# Patient Record
Sex: MALE | Race: WHITE | HISPANIC OR LATINO
[De-identification: names, ages, dates, MRNs, and addresses within clinical notes are randomized per-mention and may not be internally consistent; named-entity substitution may affect disease eponyms.]

---

## 2017-04-06 ENCOUNTER — APPOINTMENT (OUTPATIENT)
Dept: FAMILY MEDICINE | Facility: CLINIC | Age: 52
End: 2017-04-06

## 2017-04-06 ENCOUNTER — NON-APPOINTMENT (OUTPATIENT)
Age: 52
End: 2017-04-06

## 2017-04-06 ENCOUNTER — MED ADMIN CHARGE (OUTPATIENT)
Age: 52
End: 2017-04-06

## 2017-04-06 VITALS
HEART RATE: 51 BPM | TEMPERATURE: 97.6 F | RESPIRATION RATE: 14 BRPM | BODY MASS INDEX: 26.84 KG/M2 | SYSTOLIC BLOOD PRESSURE: 122 MMHG | OXYGEN SATURATION: 98 % | HEIGHT: 66 IN | DIASTOLIC BLOOD PRESSURE: 78 MMHG | WEIGHT: 167 LBS

## 2017-04-06 DIAGNOSIS — Z78.9 OTHER SPECIFIED HEALTH STATUS: ICD-10-CM

## 2017-04-06 DIAGNOSIS — Z00.00 ENCOUNTER FOR GENERAL ADULT MEDICAL EXAMINATION W/OUT ABNORMAL FINDINGS: ICD-10-CM

## 2017-04-06 DIAGNOSIS — H91.90 UNSPECIFIED HEARING LOSS, UNSPECIFIED EAR: ICD-10-CM

## 2019-03-03 ENCOUNTER — EMERGENCY (EMERGENCY)
Facility: HOSPITAL | Age: 54
LOS: 1 days | Discharge: HOME | End: 2019-03-03
Attending: EMERGENCY MEDICINE
Payer: COMMERCIAL

## 2019-03-03 VITALS
OXYGEN SATURATION: 97 % | SYSTOLIC BLOOD PRESSURE: 129 MMHG | TEMPERATURE: 99 F | HEART RATE: 57 BPM | RESPIRATION RATE: 18 BRPM | DIASTOLIC BLOOD PRESSURE: 76 MMHG

## 2019-03-03 VITALS
DIASTOLIC BLOOD PRESSURE: 81 MMHG | HEIGHT: 69 IN | OXYGEN SATURATION: 99 % | RESPIRATION RATE: 18 BRPM | HEART RATE: 72 BPM | SYSTOLIC BLOOD PRESSURE: 138 MMHG | WEIGHT: 169.98 LBS | TEMPERATURE: 99 F

## 2019-03-03 LAB
ANION GAP SERPL CALC-SCNC: 16 MMOL/L — SIGNIFICANT CHANGE UP (ref 5–17)
BASOPHILS # BLD AUTO: 0 K/UL — SIGNIFICANT CHANGE UP (ref 0–0.2)
BASOPHILS NFR BLD AUTO: 0.2 % — SIGNIFICANT CHANGE UP (ref 0–2)
BUN SERPL-MCNC: 10 MG/DL — SIGNIFICANT CHANGE UP (ref 7–23)
CALCIUM SERPL-MCNC: 9.1 MG/DL — SIGNIFICANT CHANGE UP (ref 8.4–10.5)
CHLORIDE SERPL-SCNC: 101 MMOL/L — SIGNIFICANT CHANGE UP (ref 96–108)
CO2 SERPL-SCNC: 24 MMOL/L — SIGNIFICANT CHANGE UP (ref 22–31)
CREAT SERPL-MCNC: 0.86 MG/DL — SIGNIFICANT CHANGE UP (ref 0.5–1.3)
EOSINOPHIL # BLD AUTO: 0.1 K/UL — SIGNIFICANT CHANGE UP (ref 0–0.5)
EOSINOPHIL NFR BLD AUTO: 1.1 % — SIGNIFICANT CHANGE UP (ref 0–6)
FLU A RESULT: SIGNIFICANT CHANGE UP
FLU A RESULT: SIGNIFICANT CHANGE UP
FLUAV AG NPH QL: SIGNIFICANT CHANGE UP
FLUBV AG NPH QL: DETECTED
GLUCOSE SERPL-MCNC: 94 MG/DL — SIGNIFICANT CHANGE UP (ref 70–99)
HCT VFR BLD CALC: 42.9 % — SIGNIFICANT CHANGE UP (ref 39–50)
HGB BLD-MCNC: 15.2 G/DL — SIGNIFICANT CHANGE UP (ref 13–17)
LYMPHOCYTES # BLD AUTO: 1.9 K/UL — SIGNIFICANT CHANGE UP (ref 1–3.3)
LYMPHOCYTES # BLD AUTO: 27.4 % — SIGNIFICANT CHANGE UP (ref 13–44)
MCHC RBC-ENTMCNC: 30.3 PG — SIGNIFICANT CHANGE UP (ref 27–34)
MCHC RBC-ENTMCNC: 35.4 GM/DL — SIGNIFICANT CHANGE UP (ref 32–36)
MCV RBC AUTO: 85.7 FL — SIGNIFICANT CHANGE UP (ref 80–100)
MONOCYTES # BLD AUTO: 0.7 K/UL — SIGNIFICANT CHANGE UP (ref 0–0.9)
MONOCYTES NFR BLD AUTO: 10.2 % — SIGNIFICANT CHANGE UP (ref 2–14)
NEUTROPHILS # BLD AUTO: 4.3 K/UL — SIGNIFICANT CHANGE UP (ref 1.8–7.4)
NEUTROPHILS NFR BLD AUTO: 61.2 % — SIGNIFICANT CHANGE UP (ref 43–77)
PLATELET # BLD AUTO: 245 K/UL — SIGNIFICANT CHANGE UP (ref 150–400)
POTASSIUM SERPL-MCNC: 3.7 MMOL/L — SIGNIFICANT CHANGE UP (ref 3.5–5.3)
POTASSIUM SERPL-SCNC: 3.7 MMOL/L — SIGNIFICANT CHANGE UP (ref 3.5–5.3)
RBC # BLD: 5.01 M/UL — SIGNIFICANT CHANGE UP (ref 4.2–5.8)
RBC # FLD: 11.6 % — SIGNIFICANT CHANGE UP (ref 10.3–14.5)
RSV RESULT: SIGNIFICANT CHANGE UP
RSV RNA RESP QL NAA+PROBE: SIGNIFICANT CHANGE UP
SODIUM SERPL-SCNC: 141 MMOL/L — SIGNIFICANT CHANGE UP (ref 135–145)
TROPONIN T, HIGH SENSITIVITY RESULT: <6 NG/L — SIGNIFICANT CHANGE UP (ref 0–51)
WBC # BLD: 7 K/UL — SIGNIFICANT CHANGE UP (ref 3.8–10.5)
WBC # FLD AUTO: 7 K/UL — SIGNIFICANT CHANGE UP (ref 3.8–10.5)

## 2019-03-03 PROCEDURE — 71046 X-RAY EXAM CHEST 2 VIEWS: CPT | Mod: 26

## 2019-03-03 PROCEDURE — 87631 RESP VIRUS 3-5 TARGETS: CPT

## 2019-03-03 PROCEDURE — 80048 BASIC METABOLIC PNL TOTAL CA: CPT

## 2019-03-03 PROCEDURE — 84484 ASSAY OF TROPONIN QUANT: CPT

## 2019-03-03 PROCEDURE — 99285 EMERGENCY DEPT VISIT HI MDM: CPT

## 2019-03-03 PROCEDURE — 99284 EMERGENCY DEPT VISIT MOD MDM: CPT | Mod: 25

## 2019-03-03 PROCEDURE — 85027 COMPLETE CBC AUTOMATED: CPT

## 2019-03-03 PROCEDURE — 71046 X-RAY EXAM CHEST 2 VIEWS: CPT

## 2019-03-03 RX ORDER — SODIUM CHLORIDE 9 MG/ML
2000 INJECTION INTRAMUSCULAR; INTRAVENOUS; SUBCUTANEOUS ONCE
Qty: 0 | Refills: 0 | Status: COMPLETED | OUTPATIENT
Start: 2019-03-03 | End: 2019-03-03

## 2019-03-03 RX ORDER — ACETAMINOPHEN 500 MG
975 TABLET ORAL ONCE
Qty: 0 | Refills: 0 | Status: COMPLETED | OUTPATIENT
Start: 2019-03-03 | End: 2019-03-03

## 2019-03-03 RX ADMIN — Medication 975 MILLIGRAM(S): at 19:01

## 2019-03-03 RX ADMIN — SODIUM CHLORIDE 2000 MILLILITER(S): 9 INJECTION INTRAMUSCULAR; INTRAVENOUS; SUBCUTANEOUS at 19:01

## 2019-03-03 NOTE — ED PROVIDER NOTE - ATTENDING CONTRIBUTION TO CARE
54 M no PMH p/w worsening cough, fever and diffuse chest tightness since Thursday with lungs cta, vss, cxr nl, pleuritic chest pain from cough, labs, rvp, ekg likely viral bronchitis.

## 2019-03-03 NOTE — ED PROVIDER NOTE - PHYSICAL EXAMINATION
Dayday PALACIO MD PGY1:   PHYSICAL EXAM:    GENERAL: NAD, well-developed  HEENT:  Atraumatic, Normocephalic  CHEST/LUNG: Chest rise equal bilaterally. Diffuse crackles bilaterally.   HEART: Regular rate and rhythm  ABDOMEN: Soft, Nontender, Nondistended  EXTREMITIES:  2+ Peripheral Pulses.  PSYCH: A&Ox3  SKIN: No obvious rashes or lesions

## 2019-03-03 NOTE — ED PROVIDER NOTE - NSFOLLOWUPINSTRUCTIONS_ED_ALL_ED_FT
Your diagnosis: chest tightness    Discharge instructions:    1. Please follow up with your Primary Care Doctor in 1-2 days.    2. Take any prescribed medications as instructed:    3. Others:    4. Be sure to return to the ED if you develop new or worsening symptoms. Specific signs and symptoms to be vigilant of: chest pain, pressure or tightness, difficulty breathing, sweating, nausea, vomiting, pain that travels to the arms, shoulders or jaws, palpitations, loss of consciousness, back pain, abdominal pain.

## 2019-03-03 NOTE — ED PROVIDER NOTE - CLINICAL SUMMARY MEDICAL DECISION MAKING FREE TEXT BOX
Dayday PALACIO MD PGY1: 54 M no PMH p/w worsening cough, fever and chest pain. WIll r/o pneumonia with CXR. Tylenol for fever. Trop for ACS r/o. Flu swab to eval.

## 2019-03-03 NOTE — ED ADULT TRIAGE NOTE - CCCP TRG CHIEF CMPLNT
chest pressure, fever, cough with yellow phlegm, "I have a cold" since Thursday, today pink tinged sputum

## 2019-03-03 NOTE — ED PROVIDER NOTE - OBJECTIVE STATEMENT
Dayday PALACIO MD PGY1: 54 M no PMH p/w worsening cough, fever and diffuse chest tightness since Thursday. Productive cough, initally white -> yellow-> blood tinged. No recent sick contacts.

## 2019-03-03 NOTE — ED ADULT NURSE NOTE - OBJECTIVE STATEMENT
53 y/o male presents to ed c/o chest pain that began Thursday, with yellow sputum  productive cough. States today the sputum is pink. Denies ha, n/v/d, abdominal pain, f/c, urinary symptoms, hematuria. A&Ox4, febrile, skin warm dry and intact, MAEx4, lungs CTA, abd soft nondistended. EKG shows NSR, placed on CM. Pt resting comfortably with fever, no complaints at this time. Patient's bed in the lowest position, explained plan of care to patient and family members. Will continue to reassess.

## 2019-03-03 NOTE — ED PROVIDER NOTE - PROGRESS NOTE DETAILS
[place]: patient received in sign-out. Negative cxr and trop. waiting Flu swab. However, will not treat via tamiflu even if positive, given age and lack of risk factors. Stable for discharge.

## 2019-06-10 NOTE — ED ADULT NURSE NOTE - CARDIO WDL
Normal rate, regular rhythm, normal S1, S2 heart sounds heard.
[FreeTextEntry1] : Rx for FBW\par recheck 2 weekas bp\par discussed weight loss

## 2021-03-20 ENCOUNTER — INPATIENT (INPATIENT)
Facility: HOSPITAL | Age: 56
LOS: 2 days | Discharge: HOME | DRG: 141 | End: 2021-03-23
Attending: SPECIALIST | Admitting: SPECIALIST
Payer: COMMERCIAL

## 2021-03-20 VITALS
DIASTOLIC BLOOD PRESSURE: 84 MMHG | HEIGHT: 69 IN | HEART RATE: 60 BPM | WEIGHT: 177.03 LBS | OXYGEN SATURATION: 100 % | TEMPERATURE: 98 F | SYSTOLIC BLOOD PRESSURE: 179 MMHG | RESPIRATION RATE: 16 BRPM

## 2021-03-20 DIAGNOSIS — S02.85XA FRACTURE OF ORBIT, UNSPECIFIED, INITIAL ENCOUNTER FOR CLOSED FRACTURE: ICD-10-CM

## 2021-03-20 LAB
ALBUMIN SERPL ELPH-MCNC: 4.2 G/DL — SIGNIFICANT CHANGE UP (ref 3.3–5)
ALP SERPL-CCNC: 86 U/L — SIGNIFICANT CHANGE UP (ref 40–120)
ALT FLD-CCNC: 34 U/L — SIGNIFICANT CHANGE UP (ref 10–45)
ANION GAP SERPL CALC-SCNC: 11 MMOL/L — SIGNIFICANT CHANGE UP (ref 5–17)
APTT BLD: 27.1 SEC — LOW (ref 27.5–35.5)
AST SERPL-CCNC: 26 U/L — SIGNIFICANT CHANGE UP (ref 10–40)
BASOPHILS # BLD AUTO: 0.04 K/UL — SIGNIFICANT CHANGE UP (ref 0–0.2)
BASOPHILS NFR BLD AUTO: 0.3 % — SIGNIFICANT CHANGE UP (ref 0–2)
BILIRUB SERPL-MCNC: 0.3 MG/DL — SIGNIFICANT CHANGE UP (ref 0.2–1.2)
BUN SERPL-MCNC: 13 MG/DL — SIGNIFICANT CHANGE UP (ref 7–23)
CALCIUM SERPL-MCNC: 8.8 MG/DL — SIGNIFICANT CHANGE UP (ref 8.4–10.5)
CHLORIDE SERPL-SCNC: 103 MMOL/L — SIGNIFICANT CHANGE UP (ref 96–108)
CO2 SERPL-SCNC: 24 MMOL/L — SIGNIFICANT CHANGE UP (ref 22–31)
CREAT SERPL-MCNC: 0.85 MG/DL — SIGNIFICANT CHANGE UP (ref 0.5–1.3)
EOSINOPHIL # BLD AUTO: 0.03 K/UL — SIGNIFICANT CHANGE UP (ref 0–0.5)
EOSINOPHIL NFR BLD AUTO: 0.2 % — SIGNIFICANT CHANGE UP (ref 0–6)
GLUCOSE SERPL-MCNC: 113 MG/DL — HIGH (ref 70–99)
HCT VFR BLD CALC: 41.7 % — SIGNIFICANT CHANGE UP (ref 39–50)
HGB BLD-MCNC: 14.3 G/DL — SIGNIFICANT CHANGE UP (ref 13–17)
IMM GRANULOCYTES NFR BLD AUTO: 0.3 % — SIGNIFICANT CHANGE UP (ref 0–1.5)
INR BLD: 0.98 RATIO — SIGNIFICANT CHANGE UP (ref 0.88–1.16)
LYMPHOCYTES # BLD AUTO: 1.22 K/UL — SIGNIFICANT CHANGE UP (ref 1–3.3)
LYMPHOCYTES # BLD AUTO: 9.2 % — LOW (ref 13–44)
MCHC RBC-ENTMCNC: 29.3 PG — SIGNIFICANT CHANGE UP (ref 27–34)
MCHC RBC-ENTMCNC: 34.3 GM/DL — SIGNIFICANT CHANGE UP (ref 32–36)
MCV RBC AUTO: 85.5 FL — SIGNIFICANT CHANGE UP (ref 80–100)
MONOCYTES # BLD AUTO: 0.64 K/UL — SIGNIFICANT CHANGE UP (ref 0–0.9)
MONOCYTES NFR BLD AUTO: 4.8 % — SIGNIFICANT CHANGE UP (ref 2–14)
NEUTROPHILS # BLD AUTO: 11.28 K/UL — HIGH (ref 1.8–7.4)
NEUTROPHILS NFR BLD AUTO: 85.2 % — HIGH (ref 43–77)
NRBC # BLD: 0 /100 WBCS — SIGNIFICANT CHANGE UP (ref 0–0)
PLATELET # BLD AUTO: 285 K/UL — SIGNIFICANT CHANGE UP (ref 150–400)
POTASSIUM SERPL-MCNC: 4.1 MMOL/L — SIGNIFICANT CHANGE UP (ref 3.5–5.3)
POTASSIUM SERPL-SCNC: 4.1 MMOL/L — SIGNIFICANT CHANGE UP (ref 3.5–5.3)
PROT SERPL-MCNC: 7.7 G/DL — SIGNIFICANT CHANGE UP (ref 6–8.3)
PROTHROM AB SERPL-ACNC: 11.8 SEC — SIGNIFICANT CHANGE UP (ref 10.6–13.6)
RBC # BLD: 4.88 M/UL — SIGNIFICANT CHANGE UP (ref 4.2–5.8)
RBC # FLD: 12 % — SIGNIFICANT CHANGE UP (ref 10.3–14.5)
SARS-COV-2 RNA SPEC QL NAA+PROBE: SIGNIFICANT CHANGE UP
SODIUM SERPL-SCNC: 138 MMOL/L — SIGNIFICANT CHANGE UP (ref 135–145)
WBC # BLD: 13.25 K/UL — HIGH (ref 3.8–10.5)
WBC # FLD AUTO: 13.25 K/UL — HIGH (ref 3.8–10.5)

## 2021-03-20 PROCEDURE — 72125 CT NECK SPINE W/O DYE: CPT | Mod: 26,MA

## 2021-03-20 PROCEDURE — 70486 CT MAXILLOFACIAL W/O DYE: CPT | Mod: 26,MA

## 2021-03-20 PROCEDURE — 99285 EMERGENCY DEPT VISIT HI MDM: CPT

## 2021-03-20 PROCEDURE — 76377 3D RENDER W/INTRP POSTPROCES: CPT | Mod: 26

## 2021-03-20 PROCEDURE — 70450 CT HEAD/BRAIN W/O DYE: CPT | Mod: 26,MA

## 2021-03-20 RX ORDER — DEXAMETHASONE 0.5 MG/5ML
10 ELIXIR ORAL EVERY 12 HOURS
Refills: 0 | Status: DISCONTINUED | OUTPATIENT
Start: 2021-03-20 | End: 2021-03-22

## 2021-03-20 RX ORDER — ONDANSETRON 8 MG/1
4 TABLET, FILM COATED ORAL EVERY 6 HOURS
Refills: 0 | Status: DISCONTINUED | OUTPATIENT
Start: 2021-03-20 | End: 2021-03-22

## 2021-03-20 RX ORDER — ACETAMINOPHEN 500 MG
650 TABLET ORAL EVERY 6 HOURS
Refills: 0 | Status: DISCONTINUED | OUTPATIENT
Start: 2021-03-20 | End: 2021-03-22

## 2021-03-20 RX ORDER — ENOXAPARIN SODIUM 100 MG/ML
40 INJECTION SUBCUTANEOUS DAILY
Refills: 0 | Status: DISCONTINUED | OUTPATIENT
Start: 2021-03-20 | End: 2021-03-22

## 2021-03-20 RX ORDER — DIPHENHYDRAMINE HCL 50 MG
12.5 CAPSULE ORAL EVERY 4 HOURS
Refills: 0 | Status: DISCONTINUED | OUTPATIENT
Start: 2021-03-20 | End: 2021-03-22

## 2021-03-20 RX ORDER — SENNA PLUS 8.6 MG/1
2 TABLET ORAL AT BEDTIME
Refills: 0 | Status: DISCONTINUED | OUTPATIENT
Start: 2021-03-20 | End: 2021-03-22

## 2021-03-20 RX ORDER — OXYCODONE HYDROCHLORIDE 5 MG/1
10 TABLET ORAL EVERY 6 HOURS
Refills: 0 | Status: DISCONTINUED | OUTPATIENT
Start: 2021-03-20 | End: 2021-03-22

## 2021-03-20 RX ORDER — FLUORESCEIN SODIUM 9 MG
1 STRIP OPHTHALMIC (EYE) ONCE
Refills: 0 | Status: COMPLETED | OUTPATIENT
Start: 2021-03-20 | End: 2021-03-20

## 2021-03-20 RX ORDER — OXYCODONE HYDROCHLORIDE 5 MG/1
5 TABLET ORAL EVERY 6 HOURS
Refills: 0 | Status: DISCONTINUED | OUTPATIENT
Start: 2021-03-20 | End: 2021-03-22

## 2021-03-20 RX ORDER — CHLORHEXIDINE GLUCONATE 213 G/1000ML
15 SOLUTION TOPICAL
Refills: 0 | Status: DISCONTINUED | OUTPATIENT
Start: 2021-03-20 | End: 2021-03-22

## 2021-03-20 RX ORDER — ACETAMINOPHEN 500 MG
1000 TABLET ORAL EVERY 6 HOURS
Refills: 0 | Status: DISCONTINUED | OUTPATIENT
Start: 2021-03-20 | End: 2021-03-20

## 2021-03-20 RX ORDER — OXYCODONE HYDROCHLORIDE 5 MG/1
5 TABLET ORAL ONCE
Refills: 0 | Status: DISCONTINUED | OUTPATIENT
Start: 2021-03-20 | End: 2021-03-20

## 2021-03-20 RX ADMIN — Medication 1 APPLICATION(S): at 04:47

## 2021-03-20 RX ADMIN — Medication 650 MILLIGRAM(S): at 12:17

## 2021-03-20 RX ADMIN — SENNA PLUS 2 TABLET(S): 8.6 TABLET ORAL at 21:03

## 2021-03-20 RX ADMIN — Medication 102 MILLIGRAM(S): at 20:56

## 2021-03-20 RX ADMIN — OXYCODONE HYDROCHLORIDE 5 MILLIGRAM(S): 5 TABLET ORAL at 09:50

## 2021-03-20 RX ADMIN — Medication 650 MILLIGRAM(S): at 20:57

## 2021-03-20 RX ADMIN — OXYCODONE HYDROCHLORIDE 5 MILLIGRAM(S): 5 TABLET ORAL at 09:17

## 2021-03-20 RX ADMIN — Medication 650 MILLIGRAM(S): at 19:01

## 2021-03-20 RX ADMIN — CHLORHEXIDINE GLUCONATE 15 MILLILITER(S): 213 SOLUTION TOPICAL at 19:02

## 2021-03-20 RX ADMIN — ENOXAPARIN SODIUM 40 MILLIGRAM(S): 100 INJECTION SUBCUTANEOUS at 12:18

## 2021-03-20 RX ADMIN — OXYCODONE HYDROCHLORIDE 5 MILLIGRAM(S): 5 TABLET ORAL at 03:04

## 2021-03-20 RX ADMIN — Medication 1 DROP(S): at 04:47

## 2021-03-20 NOTE — ED ADULT TRIAGE NOTE - INTERNATIONAL TRAVEL DAYS 1
7-14 days ([facility])
DISPLAY PLAN FREE TEXT
Normal rate, regular rhythm.  Heart sounds S1, S2.  No murmurs, rubs or gallops. tender L chest wall

## 2021-03-20 NOTE — CONSULT NOTE ADULT - SUBJECTIVE AND OBJECTIVE BOX
Health system DEPARTMENT OF OPHTHALMOLOGY - INITIAL ADULT CONSULT  -----------------------------------------------------------------------------------------------------------------  Leonid Xie MD PGY 2  Pager: [phone]  -----------------------------------------------------------------------------------------------------------------    HPI:    Interval History: ***    PAST MEDICAL & SURGICAL HISTORY:  No pertinent past medical history    No significant past surgical history      Past Ocular History: ***    Family History:  FAMILY HISTORY:  No pertinent family history in first degree relatives        Social History: ***    Ophthalmic Medications: ***    MEDICATIONS  (STANDING):    MEDICATIONS  (PRN):    Allergies & Intolerances:     Review of Systems:  Constitutional: No fever, chills  Eyes: No blurry vision, flashes, floaters, FBS, erythema, discharge, double vision, OU  Neuro: No tremors  Cardiovascular: No chest pain, palpitations  Respiratory: No SOB, no cough  GI: No nausea, vomiting, abdominal pain  : No dysuria  Skin: no rash  Psych: no depression  Endocrine: no polyuria, polydipsia  Heme/lymph: no swelling    VITALS: T(C): 36.6 (03-20-21 @ 05:23)  T(F): 97.8 (03-20-21 @ 05:23), Max: 97.8 (03-20-21 @ 05:23)  HR: 59 (03-20-21 @ 05:23) (59 - 62)  BP: 126/71 (03-20-21 @ 05:23) (126/71 - 179/84)  RR:  (16 - 16)  SpO2:  (100% - 100%)  Wt(kg): --  General: AAO x 3, appropriate mood and affect    Ophthalmology Exam:  Visual acuity (cc Near): 20/20 OU.  Pupils: PERRL OU, no APD  Tonometry:  17,15  Extraocular movements (EOMs): Full OU, no pain. On Extreme Down Gaze Patient reports some Diplopia  Confrontational Visual Field (CVF): Full OU.  Color Plates: 12/12 OU.    Pen Light Exam (PLE)  External: OD: Periorbital swelling OS: Normal   Lids/Lashes/Lacrimal Ducts: Flat OU.  Sclera/Conjunctiva: OD: Temporal SUbconj heme OS: White and quiet   Cornea: Clear OU  Anterior Chamber: Deep and formed OU.    Iris: Flat OU.  Lens: Clear OU.    Fundus Exam: dilated with 1% tropicamide and 2.5% phenylephrine  Approval obtained from primary team for dilation  Patient aware that pupils can remained dilated for at least 4-6 hours.  Exam performed with 20 D lens    Vitreous: wnl OU  Disc, cup/disc: sharp and pink, 0.4 OU  Macula: wnl OU  Vessels: wnl OU  Periphery: wnl OU    Labs/Imaging:  rad< from: CT Maxillofacial No Cont (03.20.21 @ 02:58) >  IMPRESSION:  Head CT: No acute intracranial hemorrhage, territorial infarct, mass effect or calvarial fracture.  Cervical spine CT: No acute fracture or traumatic spondylolisthesis.  Maxillofacial CT: Multiple acute right maxillofacial fractures described in further detail above including a displaced and angulated right lateral orbital wall fracture with the tip of the fracture fragment in close proximity to the right lateral rectus muscle and a right orbital floor/maxillary sinus roof fracture with angulation of a fracture fragment into the right orbit with the tip ofthe fracture fragment in close proximity to the right inferior rectus muscle. Correlate for extraocular muscle entrapment.  < end of copied text >   [facility] DEPARTMENT OF OPHTHALMOLOGY - INITIAL ADULT CONSULT  -----------------------------------------------------------------------------------------------------------------  Leonid Xie MD PGY 2  Pager: [phone]  -----------------------------------------------------------------------------------------------------------------    HPI:    Interval History: ***    PAST MEDICAL & SURGICAL HISTORY:  No pertinent past medical history    No significant past surgical history      Past Ocular History: ***    Family History:  FAMILY HISTORY:  No pertinent family history in first degree relatives        Social History: ***    Ophthalmic Medications: ***    MEDICATIONS  (STANDING):    MEDICATIONS  (PRN):    Allergies & Intolerances:     Review of Systems:  Constitutional: No fever, chills  Eyes: No blurry vision, flashes, floaters, FBS, erythema, discharge, double vision, OU  Neuro: No tremors  Cardiovascular: No chest pain, palpitations  Respiratory: No SOB, no cough  GI: No nausea, vomiting, abdominal pain  : No dysuria  Skin: no rash  Psych: no depression  Endocrine: no polyuria, polydipsia  Heme/lymph: no swelling    VITALS: T(C): 36.6 (03-20-21 @ 05:23)  T(F): 97.8 (03-20-21 @ 05:23), Max: 97.8 (03-20-21 @ 05:23)  HR: 59 (03-20-21 @ 05:23) (59 - 62)  BP: 126/71 (03-20-21 @ 05:23) (126/71 - 179/84)  RR:  (16 - 16)  SpO2:  (100% - 100%)  Wt(kg): --  General: AAO x 3, appropriate mood and affect    Ophthalmology Exam:  Visual acuity (cc Near): 20/20 OU.  Pupils: PERRL OU, no APD  Tonometry:  17,15  Extraocular movements (EOMs): Full OU, no pain. On Extreme Down Gaze Patient reports some Diplopia  Confrontational Visual Field (CVF): Full OU.  Color Plates: 12/12 OU.    Pen Light Exam (PLE)  External: OD: Periorbital swelling OS: Normal   Lids/Lashes/Lacrimal Ducts: Flat OU.  Sclera/Conjunctiva: OD: Temporal SUbconj heme OS: White and quiet   Cornea: Clear OU  Anterior Chamber: Deep and formed OU.    Iris: Flat OU.  Lens: Clear OU.    Fundus Exam: dilated with 1% tropicamide and 2.5% phenylephrine  Approval obtained from primary team for dilation  Patient aware that pupils can remained dilated for at least 4-6 hours.  Exam performed with 20 D lens    Vitreous: wnl OU  Disc, cup/disc: sharp and pink, 0.2 OU  Macula: wnl OU  Vessels: wnl OU  Periphery: within normal limits OU    Labs/Imaging:  rad< from: CT Maxillofacial No Cont (03.20.21 @ 02:58) >  IMPRESSION:  Head CT: No acute intracranial hemorrhage, territorial infarct, mass effect or calvarial fracture.  Cervical spine CT: No acute fracture or traumatic spondylolisthesis.  Maxillofacial CT: Multiple acute right maxillofacial fractures described in further detail above including a displaced and angulated right lateral orbital wall fracture with the tip of the fracture fragment in close proximity to the right lateral rectus muscle and a right orbital floor/maxillary sinus roof fracture with angulation of a fracture fragment into the right orbit with the tip ofthe fracture fragment in close proximity to the right inferior rectus muscle. Correlate for extraocular muscle entrapment.  < end of copied text >   [facility] DEPARTMENT OF OPHTHALMOLOGY - INITIAL ADULT CONSULT  -----------------------------------------------------------------------------------------------------------------  Leonid Xie MD PGY 2  Pager: [phone]  -----------------------------------------------------------------------------------------------------------------    HPI: as per HnP  56 male, no endorsed past medical history. Presents s/p mechanical trip and fall yesterday evening. Pt reports he was wearing socks, got up out of bed, when he slipped on his socks and fell forward hitting the R side of his head against the dresser. Per patient (-) LOC, (+) epistaxis. Now with right sided headache, r eye diplopia that has improved overtime, and eye pain. Patient also reports subjective malocclusion. Denies any preceding sx prodrome prior to the fall (including CP, SOB, dizziness, headache, palpitations). Denies any subsequent neck pain, numbness/tingling/weakness in peripheral extremities, vomiting, CP, SOB, abdominal pain.    Interval History: Ophthalnology consulted for Orbital Floor fracture. History as above. Patient has minimal pain with eye movement no nausea vomiting     Pt. Denies decreased or blurry vision,, flashes or floater, and double vision.     PAST MEDICAL & SURGICAL HISTORY:  No pertinent past medical history    No significant past surgical history      Past Ocular History:     Family History:  FAMILY HISTORY:  No pertinent family history in first degree relatives        Social History:     Ophthalmic Medications: n/a    MEDICATIONS  (STANDING):    MEDICATIONS  (PRN):    Allergies & Intolerances:     Review of Systems:  Constitutional: No fever, chills  Eyes: As Above  Neuro: No tremors  Cardiovascular: No chest pain, palpitations  Respiratory: No SOB, no cough  GI: No nausea, vomiting, abdominal pain  : No dysuria  Skin: no rash  Psych: no depression  Endocrine: no polyuria, polydipsia  Heme/lymph: no swelling    VITALS: T(C): 36.6 (03-20-21 @ 05:23)  T(F): 97.8 (03-20-21 @ 05:23), Max: 97.8 (03-20-21 @ 05:23)  HR: 59 (03-20-21 @ 05:23) (59 - 62)  BP: 126/71 (03-20-21 @ 05:23) (126/71 - 179/84)  RR:  (16 - 16)  SpO2:  (100% - 100%)  Wt(kg): --  General: AAO x 3, appropriate mood and affect    Ophthalmology Exam:  Visual acuity (cc Near): 20/20 OU.  Pupils: PERRL OU, no APD  Tonometry:  17,15  Extraocular movements (EOMs): Full OU, no pain. On Extreme Down Gaze Patient reports some Diplopia  Confrontational Visual Field (CVF): Full OU.  Color Plates: 12/12 OU.    Pen Light Exam (PLE)  External: OD: Periorbital swelling OS: Normal   Lids/Lashes/Lacrimal Ducts: Flat OU.  Sclera/Conjunctiva: OD: Temporal SUbconj heme OS: White and quiet   Cornea: Clear OU  Anterior Chamber: Deep and formed OU.    Iris: Flat OU.  Lens: Clear OU.    Fundus Exam: dilated with 1% tropicamide and 2.5% phenylephrine  Approval obtained from primary team for dilation  Patient aware that pupils can remained dilated for at least 4-6 hours.  Exam performed with 20 D lens    Vitreous: wnl OU  Disc, cup/disc: sharp and pink, 0.2 OU  Macula: wnl OU  Vessels: wnl OU  Periphery: within normal limits OU    Labs/Imaging:  rad< from: CT Maxillofacial No Cont (03.20.21 @ 02:58) >  IMPRESSION:  Head CT: No acute intracranial hemorrhage, territorial infarct, mass effect or calvarial fracture.  Cervical spine CT: No acute fracture or traumatic spondylolisthesis.  Maxillofacial CT: Multiple acute right maxillofacial fractures described in further detail above including a displaced and angulated right lateral orbital wall fracture with the tip of the fracture fragment in close proximity to the right lateral rectus muscle and a right orbital floor/maxillary sinus roof fracture with angulation of a fracture fragment into the right orbit with the tip ofthe fracture fragment in close proximity to the right inferior rectus muscle. Correlate for extraocular muscle entrapment.  < end of copied text >

## 2021-03-20 NOTE — ED ADULT NURSE NOTE - OBJECTIVE STATEMENT
The pt is a 57 y/o M no know PMH presenting to the ED c/o right-sided facial pain, +LOC s/p fall at home. The patient reports he hit his head on his dresser after getting out of bed. Upon assessment, pt is AO x 4, speaking in full and complete sentences, s1 s2 heart sounds, abd soft and nontender, bowel sounds present in all four quadrants, equal strength bilaterally, skin warm, dry and intact, present peripheral pulses, PERRL. Pt denies fever, chills, n/v, urinary symptoms, CP, dizziness, weakness, HA, SOB, abd pain. Pt positioned for comfort, appropriate side rails raised, wheels locked, bed in lowest position, pt denies needs at this time, call bell within reach.

## 2021-03-20 NOTE — ED PROVIDER NOTE - PROGRESS NOTE DETAILS
Resident Yanci: Stain applied to eye and examined under Wood's Lamp. No evidence of Yulia Sign and no evidence of uptake consistent with corneal abrasion. Resident Yanci: spoke with Ophtho MD, on his way. Pending recs. Prelim exam not consistent with globe rupture - but given diplopia on exam will require further eval. Plastics consulted. Resident Yanci: MD David (Plastics) reports will see patient. Resident Yanci: spoke with Ophtho MD, on his way. Pending recs. Prelim exam not consistent with globe rupture/entrapment- but given diplopia on exam will require further eval. Plastics consulted.

## 2021-03-20 NOTE — ED PROVIDER NOTE - NS ED ROS FT
Constitution: No Fever or chills, No Weight Loss,   Eyes: (+) R Diplopia; (+) R Eye Pain  HEENT: (+) Epistaxis; No cough, No Discharge, No Rhinorrhea, No URI symptoms  Cardio: No Chest pain, No Palpitations, No Dyspnea  Resp: No SOB, No Wheezing  GI: No abdominal pain, No Nausea, No Vomiting, No Constipation, No Diarrhea  : No burning upon urination, trouble urinating, no foul odor from urine  MSK: No Back pain, No Numbness, No Tingling, No Weakness  Neuro: (+) R Sided Headache, (+) R Eye Diplopia; No changes to Hearing, Normal Gait  Skin: No rashes, No Bruising, No Swelling

## 2021-03-20 NOTE — CONSULT NOTE ADULT - ASSESSMENT
Assessment and Recommendations:  56y male with a past medical history/ocular history of N/A consulted for Orbital Floor Fracture, found to have Orbital FLoor Fracture - Likely no Entrapment    # Orbital Floor Fracture  - No Clinical or radiological signs of entrapment - Diplopia in Extreme Downgaze (Far periphery, that would not indicate acute entrapment needing intervention) likely from Edema, and should be monitored as an outpatient for improvement.   - No Enophthalmos  - Images reviewed  - Recommend Antibiotics treatment as per primary team  - patient should avoid blowing his nose  - ice packs to eyelids for 20 minutes every 1-2 hours for first 24-48 hours  - HOB elevation to 30 degrees when at rest  - Patient counseled to report if extraocular movement pain or restriction, or diplopia develop   - Follow up with Clinic this week.   Outpatient Follow-up: Patient should follow-up with his/her ophthalmologist or with [facility] Department of Ophthalmology within 1 week of after discharge at:    [address]  [place] [address]  [phone]    Leonid Xie MD, PGY-2  Pager: [phone]  Also available on [employer] Teams Assessment and Recommendations:  56y male with a past medical history/ocular history of N/A consulted for Orbital Floor Fracture, found to have Orbital FLoor Fracture - Likely no Entrapment    # Orbital Floor Fracture  - No Clinical or radiological signs of entrapment - Diplopia in Extreme Downgaze (Far periphery, that would not indicate acute entrapment needing intervention) likely from Edema, and should be monitored as an outpatient for improvement.   - No Enophthalmos  - Images reviewed  - Recommend Antibiotics treatment as per primary team  - Plastics Evaluating patient for possible OR for multiple Facial Fractures  - patient should avoid blowing his nose  - ice packs to eyelids for 20 minutes every 1-2 hours for first 24-48 hours  - HOB elevation to 30 degrees when at rest  - Patient counseled to report if extraocular movement pain or restriction, or diplopia develop   - Follow up with Clinic this week.   Outpatient Follow-up: Patient should follow-up with his/her ophthalmologist or with [facility] Department of Ophthalmology within 1 week of after discharge at:    [address]  [place] [address]  [phone]    Leonid Xie MD, PGY-2  Pager: [phone]  Also available on [employer] Teams

## 2021-03-20 NOTE — H&P ADULT - NSHPLABSRESULTS_GEN_ALL_CORE
LABS:                         14.3   13.25 )-----------( 285      ( 20 Mar 2021 02:53 )             41.7     03-20    138  |  103  |  13  ----------------------------<  113<H>  4.1   |  24  |  0.85    Ca    8.8      20 Mar 2021 02:53    TPro  7.7  /  Alb  4.2  /  TBili  0.3  /  DBili  x   /  AST  26  /  ALT  34  /  AlkPhos  86  03-20    PT/INR - ( 20 Mar 2021 02:53 )   PT: 11.8 sec;   INR: 0.98 ratio         PTT - ( 20 Mar 2021 02:53 )  PTT:27.1 sec          RADIOLOGY:  < from: CT Maxillofacial No Cont (03.20.21 @ 02:58) >      IMPRESSION:    Head CT: No acute intracranial hemorrhage, territorial infarct, mass effect or calvarial fracture.    Cervical spine CT: No acute fracture or traumatic spondylolisthesis.    Maxillofacial CT: Multiple acute right maxillofacial fractures described in further detail above including a displaced and angulated right lateral orbital wall fracture with the tip of the fracture fragment in close proximity to the right lateral rectus muscle and a right orbital floor/maxillary sinus roof fracture with angulation of a fracture fragment into the right orbit with the tip ofthe fracture fragment in close proximity to the right inferior rectus muscle. Correlate for extraocular muscle entrapment.    < end of copied text >

## 2021-03-20 NOTE — ED PROVIDER NOTE - CLINICAL SUMMARY MEDICAL DECISION MAKING FREE TEXT BOX
56 male, no endorsed past medical history. Presents s/p mechanical trip and fall this evening while getting out of bed, now with R Eye diplopia, subconjunctival hemorrhage, and headache. Exam, presentation, and history concerning for corneal abrasion, subconjunctival hemorrhage, and possible ICH. Plan: CBC, CMP, CT Head, Max/Face, C-Spine; Eye Stain, Re-eval. AO x 3, VSS, Non-toxic.

## 2021-03-20 NOTE — ED PROVIDER NOTE - ATTENDING CONTRIBUTION TO CARE
MD Pham:  patient seen and evaluated personally.   I agree with the History & Physical,  Impression & Plan other than what was detailed in my note.  MD Phma  55 y/o m not on blood thinners, had mechanical fall (slipped on socks), jpta and hit his head on a dresser. Pt did have pos loc and is reporting 10/10 r sided facial pain and headache since event, took iburpofen but no other meds. Mild diploplia in r eye only. Deneis neck pain, cp, abd pain, difficulty w ambulating, maloclussion, hx of thinner use. afebrile vitals stable, non toxic, well appearing but does seem in pain. no ttp over scalp, no hemotypanum, raccoon eyes, no john signs, pos epistaxis to r nare, pos ttp over max face area, no maloclussion, no c/t/l/s psine ttp, no chest ttp, from of eyes, w/ no signs of entrapment, no hyphema, pos subconjuctival hemorrhage on r eye that does not cross the pupil. plan for ct head/max face, c spine, basic labs. pain meds.

## 2021-03-20 NOTE — H&P ADULT - NSHPPHYSICALEXAM_GEN_ALL_CORE
May 24, 2019         Patient: Alex Hatfield   YOB: 2008   Date of Visit: 5/24/2019           To Whom it May Concern:    Alex Hatfield was seen in my clinic on 5/24/2019. He may return to school on 5/28/19.    If you have any questions or concerns, please don't hesitate to call.        Sincerely,           OC Alvarado  Electronically Signed      ** PHYSICAL EXAM **    -- CONSTITUTIONAL: AOx3. NAD.   -- HEENT: R periorbital edema. Palpable step-off at zygomatic arch, + tenderness to palpation at arch and inferior orbital rim, no crepitus. + R subconjunctival hemorrhage.        Examination of the ears is unremarkable bilaterally: there is no evidence of otorrhea, hemotympanum, or Goldman’s sign. Intranasal examination is unremarkable bilaterally: there is no evidence of acute or active bleeding or septal hematoma.        + TTP at R TMJ, Intraoral examination is unremarkable. The patient is unable to bring teeth into proper occlusion. Maximal incisal opening 4-5cm.  -- NECK: Soft. No tenderness to palpation.  -- RESPIRATORY: Nonlabored respirations  -- NEUROLOGICAL: Pupils are equal, round, and reactive to light and accommodation bilaterally. Visual fields intact by confrontation bilaterally. Extraocular movements intact to all directions, reports diplopia that has improved. Facial motor intact and symmetric bilaterally. Facial sensory intact and symmetric bilaterally. CN2-12 intact. Shoulder shrug equal and symmetric bilaterally. Tongue protrudes in midline. Motor and sensory are grossly intact in bilateral upper and lower extremities

## 2021-03-20 NOTE — ED PROVIDER NOTE - PHYSICAL EXAMINATION
GEN - NAD; non-toxic; A+Ox3, speaking full sentences, steady gait   HENT - NC/AT, No Abrasions, No Lac/Tears, MMM, no discharge  EYES - EOMI, PERRL, no conjunctival pallor, no scleral icterus; (+) R Eye Subconjunctival Hemorrhage; 20/30 OD/OS; (+) Diplopia R Eye  NECK - C-Spine without midline tenderness, stepoffs/defor, FROM in C-Spine 45 ion either side  PULM - CTA B/L,  symmetric breath sounds  CV -  RRR, S1 S2, no murmurs 2+ Pulses B/L UE  GI - NT/ND, soft, no guarding, no rebound, no masses    MSK/EXT- no CVA tenderness; no edema, no gross deformity, warm and well perfused, no calf tenderness/swelling/erythema   SKIN - no rash or bruising  NEUROLOGIC - alert, CN2-12 intact, sensation intact, moving all 4 ext with 5/5 Strength

## 2021-03-21 ENCOUNTER — TRANSCRIPTION ENCOUNTER (OUTPATIENT)
Age: 56
End: 2021-03-21

## 2021-03-21 LAB
ANION GAP SERPL CALC-SCNC: 13 MMOL/L — SIGNIFICANT CHANGE UP (ref 5–17)
BLD GP AB SCN SERPL QL: NEGATIVE — SIGNIFICANT CHANGE UP
BUN SERPL-MCNC: 11 MG/DL — SIGNIFICANT CHANGE UP (ref 7–23)
CALCIUM SERPL-MCNC: 9.3 MG/DL — SIGNIFICANT CHANGE UP (ref 8.4–10.5)
CHLORIDE SERPL-SCNC: 104 MMOL/L — SIGNIFICANT CHANGE UP (ref 96–108)
CO2 SERPL-SCNC: 21 MMOL/L — LOW (ref 22–31)
COVID-19 SPIKE DOMAIN AB INTERP: POSITIVE
COVID-19 SPIKE DOMAIN ANTIBODY RESULT: 161 U/ML — HIGH
CREAT SERPL-MCNC: 0.81 MG/DL — SIGNIFICANT CHANGE UP (ref 0.5–1.3)
GLUCOSE SERPL-MCNC: 124 MG/DL — HIGH (ref 70–99)
HCT VFR BLD CALC: 45.2 % — SIGNIFICANT CHANGE UP (ref 39–50)
HGB BLD-MCNC: 15.5 G/DL — SIGNIFICANT CHANGE UP (ref 13–17)
INR BLD: 1 RATIO — SIGNIFICANT CHANGE UP (ref 0.88–1.16)
MCHC RBC-ENTMCNC: 29.5 PG — SIGNIFICANT CHANGE UP (ref 27–34)
MCHC RBC-ENTMCNC: 34.3 GM/DL — SIGNIFICANT CHANGE UP (ref 32–36)
MCV RBC AUTO: 85.9 FL — SIGNIFICANT CHANGE UP (ref 80–100)
NRBC # BLD: 0 /100 WBCS — SIGNIFICANT CHANGE UP (ref 0–0)
PLATELET # BLD AUTO: 335 K/UL — SIGNIFICANT CHANGE UP (ref 150–400)
POTASSIUM SERPL-MCNC: 4.5 MMOL/L — SIGNIFICANT CHANGE UP (ref 3.5–5.3)
POTASSIUM SERPL-SCNC: 4.5 MMOL/L — SIGNIFICANT CHANGE UP (ref 3.5–5.3)
PROTHROM AB SERPL-ACNC: 11.8 SEC — SIGNIFICANT CHANGE UP (ref 10.6–13.6)
RBC # BLD: 5.26 M/UL — SIGNIFICANT CHANGE UP (ref 4.2–5.8)
RBC # FLD: 12.5 % — SIGNIFICANT CHANGE UP (ref 10.3–14.5)
RH IG SCN BLD-IMP: POSITIVE — SIGNIFICANT CHANGE UP
RH IG SCN BLD-IMP: POSITIVE — SIGNIFICANT CHANGE UP
SARS-COV-2 IGG+IGM SERPL QL IA: 161 U/ML — HIGH
SARS-COV-2 IGG+IGM SERPL QL IA: POSITIVE
SARS-COV-2 RNA SPEC QL NAA+PROBE: SIGNIFICANT CHANGE UP
SODIUM SERPL-SCNC: 138 MMOL/L — SIGNIFICANT CHANGE UP (ref 135–145)
WBC # BLD: 9.05 K/UL — SIGNIFICANT CHANGE UP (ref 3.8–10.5)
WBC # FLD AUTO: 9.05 K/UL — SIGNIFICANT CHANGE UP (ref 3.8–10.5)

## 2021-03-21 RX ADMIN — Medication 102 MILLIGRAM(S): at 17:26

## 2021-03-21 RX ADMIN — Medication 650 MILLIGRAM(S): at 12:20

## 2021-03-21 RX ADMIN — Medication 650 MILLIGRAM(S): at 01:25

## 2021-03-21 RX ADMIN — Medication 650 MILLIGRAM(S): at 17:27

## 2021-03-21 RX ADMIN — ENOXAPARIN SODIUM 40 MILLIGRAM(S): 100 INJECTION SUBCUTANEOUS at 11:21

## 2021-03-21 RX ADMIN — CHLORHEXIDINE GLUCONATE 15 MILLILITER(S): 213 SOLUTION TOPICAL at 17:28

## 2021-03-21 RX ADMIN — Medication 102 MILLIGRAM(S): at 05:48

## 2021-03-21 RX ADMIN — Medication 650 MILLIGRAM(S): at 11:20

## 2021-03-21 RX ADMIN — Medication 650 MILLIGRAM(S): at 05:49

## 2021-03-21 RX ADMIN — Medication 650 MILLIGRAM(S): at 05:50

## 2021-03-21 RX ADMIN — Medication 650 MILLIGRAM(S): at 02:25

## 2021-03-21 RX ADMIN — CHLORHEXIDINE GLUCONATE 15 MILLILITER(S): 213 SOLUTION TOPICAL at 05:49

## 2021-03-22 LAB
ANION GAP SERPL CALC-SCNC: 11 MMOL/L — SIGNIFICANT CHANGE UP (ref 5–17)
BLD GP AB SCN SERPL QL: NEGATIVE — SIGNIFICANT CHANGE UP
BUN SERPL-MCNC: 15 MG/DL — SIGNIFICANT CHANGE UP (ref 7–23)
CALCIUM SERPL-MCNC: 9.1 MG/DL — SIGNIFICANT CHANGE UP (ref 8.4–10.5)
CHLORIDE SERPL-SCNC: 102 MMOL/L — SIGNIFICANT CHANGE UP (ref 96–108)
CO2 SERPL-SCNC: 24 MMOL/L — SIGNIFICANT CHANGE UP (ref 22–31)
CREAT SERPL-MCNC: 0.95 MG/DL — SIGNIFICANT CHANGE UP (ref 0.5–1.3)
GLUCOSE SERPL-MCNC: 117 MG/DL — HIGH (ref 70–99)
HCT VFR BLD CALC: 44 % — SIGNIFICANT CHANGE UP (ref 39–50)
HGB BLD-MCNC: 14.9 G/DL — SIGNIFICANT CHANGE UP (ref 13–17)
INR BLD: 0.99 RATIO — SIGNIFICANT CHANGE UP (ref 0.88–1.16)
MAGNESIUM SERPL-MCNC: 2.4 MG/DL — SIGNIFICANT CHANGE UP (ref 1.6–2.6)
MCHC RBC-ENTMCNC: 29 PG — SIGNIFICANT CHANGE UP (ref 27–34)
MCHC RBC-ENTMCNC: 33.9 GM/DL — SIGNIFICANT CHANGE UP (ref 32–36)
MCV RBC AUTO: 85.8 FL — SIGNIFICANT CHANGE UP (ref 80–100)
NRBC # BLD: 0 /100 WBCS — SIGNIFICANT CHANGE UP (ref 0–0)
PHOSPHATE SERPL-MCNC: 3.1 MG/DL — SIGNIFICANT CHANGE UP (ref 2.5–4.5)
PLATELET # BLD AUTO: 337 K/UL — SIGNIFICANT CHANGE UP (ref 150–400)
POTASSIUM SERPL-MCNC: 4.3 MMOL/L — SIGNIFICANT CHANGE UP (ref 3.5–5.3)
POTASSIUM SERPL-SCNC: 4.3 MMOL/L — SIGNIFICANT CHANGE UP (ref 3.5–5.3)
PROTHROM AB SERPL-ACNC: 11.7 SEC — SIGNIFICANT CHANGE UP (ref 10.6–13.6)
RBC # BLD: 5.13 M/UL — SIGNIFICANT CHANGE UP (ref 4.2–5.8)
RBC # FLD: 12.5 % — SIGNIFICANT CHANGE UP (ref 10.3–14.5)
RH IG SCN BLD-IMP: POSITIVE — SIGNIFICANT CHANGE UP
SODIUM SERPL-SCNC: 137 MMOL/L — SIGNIFICANT CHANGE UP (ref 135–145)
WBC # BLD: 16.89 K/UL — HIGH (ref 3.8–10.5)
WBC # FLD AUTO: 16.89 K/UL — HIGH (ref 3.8–10.5)

## 2021-03-22 PROCEDURE — 71045 X-RAY EXAM CHEST 1 VIEW: CPT | Mod: 26

## 2021-03-22 PROCEDURE — 93010 ELECTROCARDIOGRAM REPORT: CPT

## 2021-03-22 RX ORDER — CHLORHEXIDINE GLUCONATE 213 G/1000ML
15 SOLUTION TOPICAL
Refills: 0 | Status: DISCONTINUED | OUTPATIENT
Start: 2021-03-22 | End: 2021-03-23

## 2021-03-22 RX ORDER — SENNA PLUS 8.6 MG/1
2 TABLET ORAL AT BEDTIME
Refills: 0 | Status: DISCONTINUED | OUTPATIENT
Start: 2021-03-22 | End: 2021-03-23

## 2021-03-22 RX ORDER — SODIUM CHLORIDE 9 MG/ML
1000 INJECTION, SOLUTION INTRAVENOUS
Refills: 0 | Status: DISCONTINUED | OUTPATIENT
Start: 2021-03-22 | End: 2021-03-22

## 2021-03-22 RX ORDER — HYDROMORPHONE HYDROCHLORIDE 2 MG/ML
0.5 INJECTION INTRAMUSCULAR; INTRAVENOUS; SUBCUTANEOUS
Refills: 0 | Status: DISCONTINUED | OUTPATIENT
Start: 2021-03-22 | End: 2021-03-22

## 2021-03-22 RX ORDER — DEXAMETHASONE 0.5 MG/5ML
10 ELIXIR ORAL EVERY 12 HOURS
Refills: 0 | Status: DISCONTINUED | OUTPATIENT
Start: 2021-03-22 | End: 2021-03-23

## 2021-03-22 RX ORDER — DIPHENHYDRAMINE HCL 50 MG
12.5 CAPSULE ORAL EVERY 4 HOURS
Refills: 0 | Status: DISCONTINUED | OUTPATIENT
Start: 2021-03-22 | End: 2021-03-23

## 2021-03-22 RX ORDER — OXYCODONE HYDROCHLORIDE 5 MG/1
10 TABLET ORAL EVERY 6 HOURS
Refills: 0 | Status: DISCONTINUED | OUTPATIENT
Start: 2021-03-22 | End: 2021-03-23

## 2021-03-22 RX ORDER — CEFAZOLIN SODIUM 1 G
2000 VIAL (EA) INJECTION EVERY 8 HOURS
Refills: 0 | Status: DISCONTINUED | OUTPATIENT
Start: 2021-03-22 | End: 2021-03-23

## 2021-03-22 RX ORDER — OXYCODONE HYDROCHLORIDE 5 MG/1
5 TABLET ORAL EVERY 6 HOURS
Refills: 0 | Status: DISCONTINUED | OUTPATIENT
Start: 2021-03-22 | End: 2021-03-23

## 2021-03-22 RX ORDER — ONDANSETRON 8 MG/1
4 TABLET, FILM COATED ORAL EVERY 6 HOURS
Refills: 0 | Status: DISCONTINUED | OUTPATIENT
Start: 2021-03-22 | End: 2021-03-23

## 2021-03-22 RX ORDER — BENZOCAINE AND MENTHOL 5; 1 G/100ML; G/100ML
1 LIQUID ORAL THREE TIMES A DAY
Refills: 0 | Status: DISCONTINUED | OUTPATIENT
Start: 2021-03-22 | End: 2021-03-23

## 2021-03-22 RX ORDER — ENOXAPARIN SODIUM 100 MG/ML
40 INJECTION SUBCUTANEOUS DAILY
Refills: 0 | Status: DISCONTINUED | OUTPATIENT
Start: 2021-03-22 | End: 2021-03-23

## 2021-03-22 RX ORDER — ACETAMINOPHEN 500 MG
650 TABLET ORAL EVERY 6 HOURS
Refills: 0 | Status: DISCONTINUED | OUTPATIENT
Start: 2021-03-22 | End: 2021-03-23

## 2021-03-22 RX ORDER — CALCIUM CARBONATE 500(1250)
1 TABLET ORAL THREE TIMES A DAY
Refills: 0 | Status: DISCONTINUED | OUTPATIENT
Start: 2021-03-22 | End: 2021-03-23

## 2021-03-22 RX ADMIN — Medication 650 MILLIGRAM(S): at 06:55

## 2021-03-22 RX ADMIN — Medication 650 MILLIGRAM(S): at 17:16

## 2021-03-22 RX ADMIN — Medication 102 MILLIGRAM(S): at 06:24

## 2021-03-22 RX ADMIN — SENNA PLUS 2 TABLET(S): 8.6 TABLET ORAL at 22:27

## 2021-03-22 RX ADMIN — Medication 102 MILLIGRAM(S): at 17:16

## 2021-03-22 RX ADMIN — Medication 650 MILLIGRAM(S): at 17:48

## 2021-03-22 RX ADMIN — CHLORHEXIDINE GLUCONATE 15 MILLILITER(S): 213 SOLUTION TOPICAL at 06:24

## 2021-03-22 RX ADMIN — ENOXAPARIN SODIUM 40 MILLIGRAM(S): 100 INJECTION SUBCUTANEOUS at 11:52

## 2021-03-22 RX ADMIN — Medication 650 MILLIGRAM(S): at 11:52

## 2021-03-22 RX ADMIN — Medication 650 MILLIGRAM(S): at 06:25

## 2021-03-22 RX ADMIN — CHLORHEXIDINE GLUCONATE 15 MILLILITER(S): 213 SOLUTION TOPICAL at 17:16

## 2021-03-22 RX ADMIN — OXYCODONE HYDROCHLORIDE 5 MILLIGRAM(S): 5 TABLET ORAL at 22:30

## 2021-03-22 RX ADMIN — Medication 650 MILLIGRAM(S): at 12:28

## 2021-03-22 NOTE — BRIEF OPERATIVE NOTE - NSICDXBRIEFPREOP_GEN_ALL_CORE_FT
PRE-OP DIAGNOSIS:  Open fracture of right zygomaticomaxillary complex 22-Mar-2021 21:18:24  Tara Milligan

## 2021-03-22 NOTE — BRIEF OPERATIVE NOTE - COMMENTS
- clear liquid diet for 2 days, then transition to soft diet  - abx, medrol dose ge, Tylenol PRN for pain, Peridex 4x daily for 10 days  - no direct pressure to R side, keep head elevated to 45 degree angle   - no brushing teeth on R side until seen OP  - frost sutures will be removed OP on Thursday 3/25

## 2021-03-22 NOTE — BRIEF OPERATIVE NOTE - NSICDXBRIEFPOSTOP_GEN_ALL_CORE_FT
POST-OP DIAGNOSIS:  Fracture of zygomaticomaxillary complex, open, initial encounter 22-Mar-2021 21:19:28  Tara Milligan

## 2021-03-23 ENCOUNTER — TRANSCRIPTION ENCOUNTER (OUTPATIENT)
Age: 56
End: 2021-03-23

## 2021-03-23 VITALS
DIASTOLIC BLOOD PRESSURE: 87 MMHG | TEMPERATURE: 98 F | OXYGEN SATURATION: 95 % | RESPIRATION RATE: 16 BRPM | SYSTOLIC BLOOD PRESSURE: 150 MMHG | HEART RATE: 62 BPM

## 2021-03-23 PROCEDURE — 85730 THROMBOPLASTIN TIME PARTIAL: CPT

## 2021-03-23 PROCEDURE — 85027 COMPLETE CBC AUTOMATED: CPT

## 2021-03-23 PROCEDURE — 71045 X-RAY EXAM CHEST 1 VIEW: CPT

## 2021-03-23 PROCEDURE — 86900 BLOOD TYPING SEROLOGIC ABO: CPT

## 2021-03-23 PROCEDURE — U0005: CPT

## 2021-03-23 PROCEDURE — C1713: CPT

## 2021-03-23 PROCEDURE — 86850 RBC ANTIBODY SCREEN: CPT

## 2021-03-23 PROCEDURE — 84100 ASSAY OF PHOSPHORUS: CPT

## 2021-03-23 PROCEDURE — 87635 SARS-COV-2 COVID-19 AMP PRB: CPT

## 2021-03-23 PROCEDURE — 85025 COMPLETE CBC W/AUTO DIFF WBC: CPT

## 2021-03-23 PROCEDURE — 83735 ASSAY OF MAGNESIUM: CPT

## 2021-03-23 PROCEDURE — 86901 BLOOD TYPING SEROLOGIC RH(D): CPT

## 2021-03-23 PROCEDURE — 93005 ELECTROCARDIOGRAM TRACING: CPT

## 2021-03-23 PROCEDURE — 72125 CT NECK SPINE W/O DYE: CPT

## 2021-03-23 PROCEDURE — 76377 3D RENDER W/INTRP POSTPROCES: CPT

## 2021-03-23 PROCEDURE — 80048 BASIC METABOLIC PNL TOTAL CA: CPT

## 2021-03-23 PROCEDURE — U0003: CPT

## 2021-03-23 PROCEDURE — C9399: CPT

## 2021-03-23 PROCEDURE — 70486 CT MAXILLOFACIAL W/O DYE: CPT

## 2021-03-23 PROCEDURE — 80053 COMPREHEN METABOLIC PANEL: CPT

## 2021-03-23 PROCEDURE — 86769 SARS-COV-2 COVID-19 ANTIBODY: CPT

## 2021-03-23 PROCEDURE — 70450 CT HEAD/BRAIN W/O DYE: CPT

## 2021-03-23 PROCEDURE — 99285 EMERGENCY DEPT VISIT HI MDM: CPT | Mod: 25

## 2021-03-23 PROCEDURE — 85610 PROTHROMBIN TIME: CPT

## 2021-03-23 RX ORDER — CHLORHEXIDINE GLUCONATE 213 G/1000ML
15 SOLUTION TOPICAL
Qty: 120 | Refills: 0
Start: 2021-03-23

## 2021-03-23 RX ORDER — OXYCODONE HYDROCHLORIDE 5 MG/1
1 TABLET ORAL
Qty: 12 | Refills: 0
Start: 2021-03-23 | End: 2021-03-25

## 2021-03-23 RX ORDER — BACITRACIN 500 [USP'U]/G
1 OINTMENT OPHTHALMIC
Refills: 0 | Status: DISCONTINUED | OUTPATIENT
Start: 2021-03-23 | End: 2021-03-23

## 2021-03-23 RX ADMIN — Medication 100 MILLIGRAM(S): at 05:28

## 2021-03-23 RX ADMIN — Medication 102 MILLIGRAM(S): at 05:28

## 2021-03-23 RX ADMIN — Medication 650 MILLIGRAM(S): at 06:06

## 2021-03-23 RX ADMIN — Medication 100 MILLIGRAM(S): at 11:22

## 2021-03-23 RX ADMIN — BACITRACIN 1 APPLICATION(S): 500 OINTMENT OPHTHALMIC at 09:04

## 2021-03-23 RX ADMIN — Medication 650 MILLIGRAM(S): at 11:56

## 2021-03-23 RX ADMIN — Medication 650 MILLIGRAM(S): at 05:29

## 2021-03-23 RX ADMIN — CHLORHEXIDINE GLUCONATE 15 MILLILITER(S): 213 SOLUTION TOPICAL at 05:29

## 2021-03-23 RX ADMIN — Medication 650 MILLIGRAM(S): at 11:21

## 2021-03-23 RX ADMIN — ENOXAPARIN SODIUM 40 MILLIGRAM(S): 100 INJECTION SUBCUTANEOUS at 11:21

## 2021-03-23 NOTE — PROGRESS NOTE ADULT - REASON FOR ADMISSION
radha, [facility] [place]
radha, [facility] [place] fx
radha, [facility] [place] fx
radha, [facility] [place]

## 2021-03-23 NOTE — DISCHARGE NOTE PROVIDER - NSDCFUADDINST_GEN_ALL_CORE_FT
Please note that you have been discharged with a "Frost" suture in place.  This suture goes from your eyelid and is taped to your forehead.  Please do not try to remove this stich as it helps with the healing process. This stich will be removed in the office at your first post operative visit.  If the tape comes off please try to tape the stich back in place to your forehead.  If the stich comes completely out please contact your surgeon.     Continue with a clear liquid diet until Thursday, when you follow up with Dr. Nayak in the office.     Continue with twice a day mouthwash    Keep the head of your bed elevated to reduce swelling.     AVOID ALL PRESSURE TO RIGHT SIDE OF YOUR FACE. Avoid sleeping on right side of your face.     You may shower, however AVOID getting your face wet.     Please follow up with Dr. Nayak on THURSDAY after discharge from the hospital. You may call [phone] to schedule an appointment. At this time she will remove the stitch on your eye.

## 2021-03-23 NOTE — DISCHARGE NOTE PROVIDER - NSDCCPTREATMENT_GEN_ALL_CORE_FT
PRINCIPAL PROCEDURE  Procedure: ORIF, fracture, zygomaticomaxillary complex  Findings and Treatment:

## 2021-03-23 NOTE — PROGRESS NOTE ADULT - ASSESSMENT
A/p : 55 y/o M POD 1 s/p ORIF right ZMC and orbit    Plan  - keep face dry  - no nose blowing  - bacitracin applications BID  - soft food diet only; purees preferable - no chewing  - Peridex TID x 7 days  - Duricef BID x 7 days  - follow up Thursday in our [place] office
ASSESSMENT/PLAN:   LLOYD YO is a 56yMale s/p mechanical fall at home, p/w R ZMC fracture and R coronoid fracture.     - OR planning for Monday, consented, booked, preop  - NPOpMN  - AM labs  - COVID pending  - Appreciate optho input: no concern for entrapment, diplopia on extreme downgaze  - Decadron 10mg IV BID  - No abx necessary  - Soft diet  - ice packs to eyelids for 20 minutes every 1-2 hours for first 24-48 hours  - HOB elevation   - sinus precautions, avoid nose blowing  - DVT ppx      Kady ANGUS Ragland MD PGY1  Plastic Surgery   [facility]: [id number]  [facility]: [phone]
ASSESSMENT/PLAN:   LLOYD YO is a 56yMale s/p mechanical fall at home, p/w R ZMC fracture and R coronoid fracture. Now s/p repair of R ZMC fracture on 3/22    - CLD  - Avoid all pressure to R side of face  - Patel stitch in place, do not disturb  - C/w abx  - pain control  - DC home today, prescriptions [facility] to [facility]  - FU with Dr. Nayak on Thursday       Kady Ragland MD PGY1  Plastic Surgery   [facility]: [id number]  [facility]: [phone]
ASSESSMENT/PLAN:   LLOYD YO is a 56yMale s/p mechanical fall at home, p/w R ZMC fracture and R coronoid fracture.     - OR today - consented, booked, preop  - NPO  - FU AM labs  - COVID negative  - Appreciate optho input: no concern for entrapment, diplopia on extreme downgaze  - Decadron 10mg IV BID  - No abx necessary  - HOB elevation   - sinus precautions, avoid nose blowing  - DVT ppx      Kady Ragland MD PGY1  Plastic Surgery   [facility]: [id number]  [facility]: [phone]

## 2021-03-23 NOTE — DISCHARGE NOTE PROVIDER - NSDCMRMEDTOKEN_GEN_ALL_CORE_FT
cefadroxil 500 mg oral capsule: 1 cap(s) orally 2 times a day MDD:2  chlorhexidine 0.12% mucous membrane liquid: 15 milliliter(s) mucous membrane 2 times a day MDD:30ml  methylPREDNISolone 4 mg oral tablet: 1 tab(s) orally 6 times a day MDD:6 tablets on day 1, 5 tablets on day 2, 4 tablets on day 3, 3 tablets on day 4, 2 tablets on day 5, 1 tablet on day 6  oxyCODONE 5 mg oral tablet: 1 tab(s) orally every 6 hours MDD:4 tabs a day

## 2021-03-23 NOTE — PROGRESS NOTE ADULT - SUBJECTIVE AND OBJECTIVE BOX
Plastic Surgery Progress Note (pg [facility]: [id number], [facility]: [phone])    SUBJECTIVE  The patient was seen and examined. No acute events overnight.    OBJECTIVE  ___________________________________________________  VITAL SIGNS / I&O's   Vital Signs Last 24 Hrs  T(C): 36.8 (22 Mar 2021 05:41), Max: 36.8 (21 Mar 2021 16:09)  T(F): 98.2 (22 Mar 2021 05:41), Max: 98.3 (21 Mar 2021 16:09)  HR: 55 (22 Mar 2021 05:41) (55 - 69)  BP: 115/66 (22 Mar 2021 05:41) (109/56 - 144/66)  BP(mean): --  RR: 16 (22 Mar 2021 05:41) (16 - 16)  SpO2: 96% (22 Mar 2021 05:41) (95% - 97%)      20 Mar 2021 07:01  -  21 Mar 2021 07:00  --------------------------------------------------------  IN:    Oral Fluid: 480 mL  Total IN: 480 mL    OUT:    Voided (mL): 3175 mL  Total OUT: 3175 mL    Total NET: -2695 mL      21 Mar 2021 07:01  -  22 Mar 2021 06:58  --------------------------------------------------------  IN:    IV PiggyBack: 50 mL    Oral Fluid: 657 mL  Total IN: 707 mL    OUT:    Stool (mL): 2 mL    Voided (mL): 4000 mL  Total OUT: 4002 mL    Total NET: -3295 mL        ___________________________________________________  PHYSICAL EXAM    -- CONSTITUTIONAL: AOx3. NAD.   -- HEENT: minimal periorbital edema, Palpable step-off at zygomatic arch, + tenderness to palpation at arch and inferior orbital rim, no crepitus. + R subconjunctival hemorrhage.        + TTP at R TMJ, Intraoral examination is unremarkable. Maximal incisal opening 4-5cm.  -- RESPIRATORY: Nonlabored respirations  -- NEUROLOGICAL: Pupils are equal, round, and reactive to light and accommodation bilaterally. Visual fields intact by confrontation bilaterally. Extraocular movements intact to all directions, reports diplopia that has improved. Facial motor intact and symmetric bilaterally. Facial sensory intact and symmetric bilaterally. CN2-12 intact. Shoulder shrug equal and symmetric bilaterally. Tongue protrudes in midline. Motor and sensory are grossly intact in bilateral upper and lower extremities    ___________________________________________________  LABS                        15.5   9.05  )-----------( 335      ( 21 Mar 2021 09:42 )             45.2     21 Mar 2021 09:42    138    |  104    |  11     ----------------------------<  124    4.5     |  21     |  0.81     Ca    9.3        21 Mar 2021 09:42      PT/INR - ( 21 Mar 2021 10:51 )   PT: 11.8 sec;   INR: 1.00 ratio      ___________________________________________________  MEDICATIONS  (STANDING):  acetaminophen   Tablet .. 650 milliGRAM(s) Oral every 6 hours  chlorhexidine 0.12% Liquid 15 milliLiter(s) Oral Mucosa two times a day  dexAMETHasone  IVPB 10 milliGRAM(s) IV Intermittent every 12 hours  enoxaparin Injectable 40 milliGRAM(s) SubCutaneous daily  senna 2 Tablet(s) Oral at bedtime    MEDICATIONS  (PRN):  diphenhydrAMINE   Injectable 12.5 milliGRAM(s) IV Push every 4 hours PRN Itching  ondansetron Injectable 4 milliGRAM(s) IV Push every 6 hours PRN Nausea  oxyCODONE    IR 10 milliGRAM(s) Oral every 6 hours PRN Severe Pain (7 - 10)  oxyCODONE    IR 5 milliGRAM(s) Oral every 6 hours PRN Moderate Pain (4 - 6)  
Plastic Surgery Progress Note (pg [facility]: [id number], [facility]: [phone])    SUBJECTIVE  The patient was seen and examined. No acute events overnight.    OBJECTIVE  ___________________________________________________  VITAL SIGNS / I&O's   Vital Signs Last 24 Hrs  T(C): 36.6 (21 Mar 2021 08:53), Max: 37.1 (20 Mar 2021 10:54)  T(F): 97.9 (21 Mar 2021 08:53), Max: 98.7 (20 Mar 2021 10:54)  HR: 61 (21 Mar 2021 08:53) (55 - 83)  BP: 130/77 (21 Mar 2021 08:53) (103/62 - 134/65)  BP(mean): --  RR: 16 (21 Mar 2021 08:53) (16 - 18)  SpO2: 96% (21 Mar 2021 08:53) (96% - 100%)      20 Mar 2021 07:01  -  21 Mar 2021 07:00  --------------------------------------------------------  IN:    Oral Fluid: 480 mL  Total IN: 480 mL    OUT:    Voided (mL): 3175 mL  Total OUT: 3175 mL    Total NET: -2695 mL      21 Mar 2021 07:01  -  21 Mar 2021 10:39  --------------------------------------------------------  IN:    Oral Fluid: 417 mL  Total IN: 417 mL    OUT:  Total OUT: 0 mL    Total NET: 417 mL        ___________________________________________________  PHYSICAL EXAM    -- CONSTITUTIONAL: AOx3. NAD.   -- HEENT: R periorbital edema. Palpable step-off at zygomatic arch, + tenderness to palpation at arch and inferior orbital rim, no crepitus. + R subconjunctival hemorrhage.        Examination of the ears is unremarkable bilaterally: there is no evidence of otorrhea, hemotympanum, or Goldman’s sign. Intranasal examination is unremarkable bilaterally: there is no evidence of acute or active bleeding or septal hematoma.        + TTP at R TMJ, Intraoral examination is unremarkable. The patient is unable to bring teeth into proper occlusion. Maximal incisal opening 4-5cm.  -- NECK: Soft. No tenderness to palpation.  -- RESPIRATORY: Nonlabored respirations  -- NEUROLOGICAL: Pupils are equal, round, and reactive to light and accommodation bilaterally. Visual fields intact by confrontation bilaterally. Extraocular movements intact to all directions, reports diplopia that has improved. Facial motor intact and symmetric bilaterally. Facial sensory intact and symmetric bilaterally. CN2-12 intact. Shoulder shrug equal and symmetric bilaterally. Tongue protrudes in midline. Motor and sensory are grossly intact in bilateral upper and lower extremities    ___________________________________________________  LABS                        15.5   9.05  )-----------( 335      ( 21 Mar 2021 09:42 )             45.2     21 Mar 2021 09:42    138    |  104    |  11     ----------------------------<  124    4.5     |  21     |  0.81     Ca    9.3        21 Mar 2021 09:42    TPro  7.7    /  Alb  4.2    /  TBili  0.3    /  DBili  x      /  AST  26     /  ALT  34     /  AlkPhos  86     20 Mar 2021 02:53    PT/INR - ( 20 Mar 2021 02:53 )   PT: 11.8 sec;   INR: 0.98 ratio         PTT - ( 20 Mar 2021 02:53 )  PTT:27.1 sec    ___________________________________________________  MEDICATIONS  (STANDING):  acetaminophen   Tablet .. 650 milliGRAM(s) Oral every 6 hours  chlorhexidine 0.12% Liquid 15 milliLiter(s) Oral Mucosa two times a day  dexAMETHasone  IVPB 10 milliGRAM(s) IV Intermittent every 12 hours  enoxaparin Injectable 40 milliGRAM(s) SubCutaneous daily  senna 2 Tablet(s) Oral at bedtime    MEDICATIONS  (PRN):  diphenhydrAMINE   Injectable 12.5 milliGRAM(s) IV Push every 4 hours PRN Itching  ondansetron Injectable 4 milliGRAM(s) IV Push every 6 hours PRN Nausea  oxyCODONE    IR 10 milliGRAM(s) Oral every 6 hours PRN Severe Pain (7 - 10)  oxyCODONE    IR 5 milliGRAM(s) Oral every 6 hours PRN Moderate Pain (4 - 6)  
Plastic Surgery Progress Note (pg [facility]: [id number], [facility]: [phone])    SUBJECTIVE  The patient was seen and examined. No acute events overnight. S/p ORIF of R ZMC fx yesterday evening. Recovering appropriately this morning.     OBJECTIVE  ___________________________________________________  VITAL SIGNS / I&O's   Vital Signs Last 24 Hrs  T(C): 36.9 (23 Mar 2021 04:07), Max: 36.9 (22 Mar 2021 09:06)  T(F): 98.4 (23 Mar 2021 04:07), Max: 98.5 (22 Mar 2021 09:06)  HR: 76 (23 Mar 2021 04:07) (53 - 76)  BP: 132/65 (23 Mar 2021 04:07) (105/60 - 156/73)  BP(mean): 105 (22 Mar 2021 22:30) (99 - 107)  RR: 16 (23 Mar 2021 04:07) (16 - 18)  SpO2: 97% (23 Mar 2021 04:07) (95% - 100%)      22 Mar 2021 07:01  -  23 Mar 2021 07:00  --------------------------------------------------------  IN:    IV PiggyBack: 50 mL    Oral Fluid: 500 mL  Total IN: 550 mL    OUT:    Voided (mL): 1000 mL  Total OUT: 1000 mL    Total NET: -450 mL        ___________________________________________________  PHYSICAL EXAM    -- CONSTITUTIONAL: NAD, lying in bed  -- NEURO: Awake, alert  -- HEENT: R frost stitch in place, lower eyelid incision CDI, R upper gingivobuccal sulcus incision intact  CN2-12 intact  moderate R periorbital edema   -- PULM: Non-labored respirations    ___________________________________________________  LABS                        14.9   16.89 )-----------( 337      ( 22 Mar 2021 07:06 )             44.0     22 Mar 2021 07:05    137    |  102    |  15     ----------------------------<  117    4.3     |  24     |  0.95     Ca    9.1        22 Mar 2021 07:05  Phos  3.1       22 Mar 2021 07:05  Mg     2.4       22 Mar 2021 07:05      PT/INR - ( 22 Mar 2021 07:42 )   PT: 11.7 sec;   INR: 0.99 ratio        ___________________________________________________  MEDICATIONS  (STANDING):  acetaminophen   Tablet .. 650 milliGRAM(s) Oral every 6 hours  BACItracin   Ophthalmic Ointment 1 Application(s) Right EYE two times a day  ceFAZolin   IVPB 2000 milliGRAM(s) IV Intermittent every 8 hours  chlorhexidine 0.12% Liquid 15 milliLiter(s) Oral Mucosa two times a day  dexAMETHasone  IVPB 10 milliGRAM(s) IV Intermittent every 12 hours  enoxaparin Injectable 40 milliGRAM(s) SubCutaneous daily  senna 2 Tablet(s) Oral at bedtime    MEDICATIONS  (PRN):  benzocaine 15 mG/menthol 3.6 mG (Sugar-Free) Lozenge 1 Lozenge Oral three times a day PRN Sore Throat  calcium carbonate    500 mG (Tums) Chewable 1 Tablet(s) Chew three times a day PRN Dyspepsia  diphenhydrAMINE   Injectable 12.5 milliGRAM(s) IV Push every 4 hours PRN Itching  ondansetron Injectable 4 milliGRAM(s) IV Push every 6 hours PRN Nausea  oxyCODONE    IR 5 milliGRAM(s) Oral every 6 hours PRN Moderate Pain (4 - 6)  oxyCODONE    IR 10 milliGRAM(s) Oral every 6 hours PRN Severe Pain (7 - 10)  
Pt awake and alert w/ no acute complaints.  POD1 ORIF Right ZMC and right orbit      VITAL SIGNS / I&O's   Vital Signs Last 24 Hrs  T(C): 36.9 (23 Mar 2021 04:07), Max: 36.9 (22 Mar 2021 09:06)  T(F): 98.4 (23 Mar 2021 04:07), Max: 98.5 (22 Mar 2021 09:06)  HR: 76 (23 Mar 2021 04:07) (53 - 76)  BP: 132/65 (23 Mar 2021 04:07) (105/60 - 156/73)  BP(mean): 105 (22 Mar 2021 22:30) (99 - 107)  RR: 16 (23 Mar 2021 04:07) (16 - 18)  SpO2: 97% (23 Mar 2021 04:07) (95% - 100%)      22 Mar 2021 07:01  -  23 Mar 2021 07:00  --------------------------------------------------------  IN:    IV PiggyBack: 50 mL    Oral Fluid: 500 mL  Total IN: 550 mL    OUT:    Voided (mL): 1000 mL  Total OUT: 1000 mL    Total NET: -450 mL      Physical Exam:  General : awake, alert, in no acute distress  HEENT: moderate right sided facial edema, Frost sutures intact and steri strips adhered to pts forehead, facial sensation intact, intra oral sutures are C/D/I, infraorbital sutures are C/D/I, no dehiscence no overt signs of infection  Skin : warm, dry

## 2021-03-23 NOTE — DISCHARGE NOTE PROVIDER - NSDCCPCAREPLAN_GEN_ALL_CORE_FT
PRINCIPAL DISCHARGE DIAGNOSIS  Diagnosis: Orbital fracture  Assessment and Plan of Treatment:

## 2021-03-23 NOTE — DISCHARGE NOTE NURSING/CASE MANAGEMENT/SOCIAL WORK - PATIENT PORTAL LINK FT
You can access the FollowMyHealth Patient Portal offered by [facility] by registering at the following website: [email or web address]. By joining [facility]’s FollowMyHealth portal, you will also be able to view your health information using other applications (apps) compatible with our system.

## 2021-03-23 NOTE — DISCHARGE NOTE PROVIDER - CARE PROVIDER_API CALL
Jud Nayak)  Plastic Surgery  [address] [place] [address]  Phone: [phone]  Fax: [phone]  Follow Up Time: 1-3 days

## 2022-02-08 NOTE — PATIENT PROFILE ADULT - NSPRONUTRITIONRISK_GEN_A_NUR
No indicators present Zygomaticofacial Flap Text: Given the location of the defect, shape of the defect and the proximity to free margins a zygomaticofacial flap was deemed most appropriate for repair.  Using a sterile surgical marker, the appropriate flap was drawn incorporating the defect and placing the expected incisions within the relaxed skin tension lines where possible. The area thus outlined was incised deep to adipose tissue with a #15 scalpel blade with preservation of a vascular pedicle.  The skin margins were undermined to an appropriate distance in all directions utilizing iris scissors.  The flap was then placed into the defect and anchored with interrupted buried subcutaneous sutures.

## 2022-09-27 NOTE — PATIENT PROFILE ADULT - IS THERE A SUSPICION OF ABUSE/NEGLIGENCE?
no Azelaic Acid Pregnancy And Lactation Text: This medication is considered safe during pregnancy and breast feeding.

## 2022-12-08 NOTE — ED ADULT TRIAGE NOTE - TEMPERATURE IN CELSIUS (DEGREES C)
"Email sent to  center SW requesting an update on if pt has home services due to new blindness and if he is ready to schedule [facility] visits.     12/8/22 addendum: \"I just went out to the treatment flood and approached Deven with this question. He indicated that he would be open to being contacted to schedule appointments for a transplant; I explained that he would effectively start from scratch with all of the appointments and he was still interested. I have some reservations about managing his appointments. He will attend appointments but sometimes gets overwhelmed and irritable with the amount of time he spends at appointments. He is already going to a few different providers for his follow-up appointment regarding his eyes and general care. He does now have a home nurse that comes and helps him set up medications and reads his mail weekly; pt declined PCA services - advising that he only needs help with having his mail read. It sounds like he has had an eye surgery and regained some sight in one of his eyes. I gave him a handout in large bold font with your direct phone number and directed him to reach out to you if he doesn t hear from you or if he has questions.\"     " 36.4

## 2023-10-30 NOTE — ED PROVIDER NOTE - OBJECTIVE STATEMENT
[facility] and not [facility] the [facility], Lizabeth called [phone], asked to call back for admissions. Lizabeth will fax information and follow with transport after HD.    56 male, no endorsed past medical history. Presents s/p mechanical trip and fall this evening while getting out of bed. Pt reports he was wearing socks, got up out of bed, when he slipped on his socks and fell forward hitting the R side of his head against the dresser. (+) LOC, (+) epistaxis. Now with right sided headache, r eye diplopia and eye pain. Denies any preceding sx prodrome prior to the fall (including CP, SOB, dizziness, headache, palpitations). Denies any subsequent neck pain, numbness/tingling/weakness in peripheral extremities, vomiting, CP, SOB, abdominal pain, diarrhea, constipation, bloody stools, dysuric symptoms.

## 2023-11-16 NOTE — H&P ADULT - ASSESSMENT
no clubbing, cyanosis, or edema
ASSESSMENT/PLAN:   LLOYD YO is a 56yMale s/p mechanical fall at home, p/w R ZMC fracture and R coronoid fracture.     - OR planning for Monday, plan to consent, book, and preop  - Appreciate optho input: no concern for entrapment, diplopia on extreme downgaze  - Decadron 10mg IV BID  - No abx necessary  - Soft diet  - ice packs to eyelids for 20 minutes every 1-2 hours for first 24-48 hours  - HOB elevation   - avoid nose blowing  - DVT ppx      Kady ANGUS Ragland MD PGY1  Plastic Surgery   [facility]: [id number]  [facility]: [phone]

## 2025-02-12 NOTE — ED PROVIDER NOTE - RESPIRATORY [+], MLM
Transition of Care video discharge education - medication calendar given to patient  Emily De Leon, PharmD Candidate '25 for Jessie Riley, JeyD. Clinical Pharmacy Specialist, Department of Orthopedics Surgery at [facility]. 
Admission medication reconciliation POD1 
SHORTNESS OF BREATH/COUGH